# Patient Record
Sex: FEMALE | Race: BLACK OR AFRICAN AMERICAN | NOT HISPANIC OR LATINO | Employment: PART TIME | URBAN - METROPOLITAN AREA
[De-identification: names, ages, dates, MRNs, and addresses within clinical notes are randomized per-mention and may not be internally consistent; named-entity substitution may affect disease eponyms.]

---

## 2020-02-20 ENCOUNTER — HOSPITAL ENCOUNTER (EMERGENCY)
Facility: HOSPITAL | Age: 25
Discharge: HOME | End: 2020-02-20
Attending: EMERGENCY MEDICINE
Payer: MEDICAID

## 2020-02-20 VITALS
DIASTOLIC BLOOD PRESSURE: 61 MMHG | BODY MASS INDEX: 30.53 KG/M2 | HEIGHT: 66 IN | TEMPERATURE: 98 F | RESPIRATION RATE: 18 BRPM | SYSTOLIC BLOOD PRESSURE: 112 MMHG | WEIGHT: 190 LBS | OXYGEN SATURATION: 100 % | HEART RATE: 63 BPM

## 2020-02-20 DIAGNOSIS — F41.9 ANXIETY: Primary | ICD-10-CM

## 2020-02-20 DIAGNOSIS — R07.9 CHEST PAIN: ICD-10-CM

## 2020-02-20 LAB
B-HCG UR QL: NEGATIVE
CTP QC/QA: YES

## 2020-02-20 PROCEDURE — 63600175 PHARM REV CODE 636 W HCPCS: Performed by: EMERGENCY MEDICINE

## 2020-02-20 PROCEDURE — 93005 ELECTROCARDIOGRAM TRACING: CPT

## 2020-02-20 PROCEDURE — 81025 URINE PREGNANCY TEST: CPT | Performed by: EMERGENCY MEDICINE

## 2020-02-20 PROCEDURE — 93010 EKG 12-LEAD: ICD-10-PCS | Mod: ,,, | Performed by: INTERNAL MEDICINE

## 2020-02-20 PROCEDURE — 96374 THER/PROPH/DIAG INJ IV PUSH: CPT

## 2020-02-20 PROCEDURE — 99285 EMERGENCY DEPT VISIT HI MDM: CPT | Mod: 25

## 2020-02-20 PROCEDURE — 93010 ELECTROCARDIOGRAM REPORT: CPT | Mod: ,,, | Performed by: INTERNAL MEDICINE

## 2020-02-20 RX ORDER — LORAZEPAM 2 MG/ML
1 INJECTION INTRAMUSCULAR
Status: COMPLETED | OUTPATIENT
Start: 2020-02-20 | End: 2020-02-20

## 2020-02-20 RX ADMIN — LORAZEPAM 1 MG: 2 INJECTION INTRAMUSCULAR; INTRAVENOUS at 11:02

## 2020-02-20 NOTE — ED TRIAGE NOTES
Pt in room complaining  Of pain left side and middle of chest pt states she has history anxiety. Pt said she was at work at [employer] when she started feeling this way. Pt is Aox3. RR noted even and unlabored . VSS

## 2020-02-20 NOTE — ED PROVIDER NOTES
"Encounter Date: 2/20/2020    SCRIBE #1 NOTE: I, Shahriar Francois, am scribing for, and in the presence of,  Devaughn Stone MD. I have scribed the following portions of the note - the EKG reading. Other sections scribed: HPI, ROS, PE, MDM.       History     Chief Complaint   Patient presents with    Chest Pain     sudden onset of CP while at work this morning. pain localized to generalized anterior chest wall. denies radiation. Pt tearful in triage. hx of panic attacks.      This 24 y.o F with no pertinent PMHx presents to the ED c/p acute onset of constant and severe (10/10) chest pain described as "sharp" and "heaviness" which began x1 hour ago. 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 The pt states she arrived to work at 0500h this AM. She does report previous episodes of panic attacks, but states her current symptoms are previous episodes. She denies a hx of asthma. She denies appetite change, dysuria, difficulty urinating, constipation, diarrhea, rash and any other associated symptoms. She does smoke cigarettes. She does not consume EtOH or use illicit drugs.           The history is provided by the patient.     Review of patient's allergies indicates:   Allergen Reactions    Asa [aspirin] Anaphylaxis    Tylenol [acetaminophen] Anaphylaxis     History reviewed. No pertinent past medical history.  No past surgical history on file.  History reviewed. No pertinent family history.  Social History     Tobacco Use    Smoking status: Not on file   Substance Use Topics    Alcohol use: Not on file    Drug use: Not on file     Review of Systems   Constitutional: Negative for appetite change.   Cardiovascular: Positive for chest pain.   Gastrointestinal: Negative for constipation and diarrhea.   Genitourinary: Negative for difficulty urinating and dysuria.   Skin: Negative for rash.   All other systems reviewed and are negative.      Physical Exam     Initial Vitals [02/20/20 1019] "   BP Pulse Resp Temp SpO2   116/63 88 17 98.5 °F (36.9 °C) 99 %      MAP       --         Physical Exam    Nursing note and vitals reviewed.  Constitutional: She appears well-developed and well-nourished.  Non-toxic appearance. No distress.   Lying comfortably   HENT:   Head: Normocephalic and atraumatic.   Mouth/Throat: Oropharynx is clear and moist and mucous membranes are normal.   Eyes: Conjunctivae and EOM are normal. Pupils are equal, round, and reactive to light. Right conjunctiva is not injected. Left conjunctiva is not injected. No scleral icterus.   Neck: Normal range of motion and full passive range of motion without pain. Neck supple.   Cardiovascular: Normal rate, regular rhythm, S1 normal, S2 normal, normal heart sounds and normal pulses. Exam reveals no gallop and no friction rub.    No murmur heard.  Pulses:       Radial pulses are 2+ on the right side, and 2+ on the left side.   Pulmonary/Chest: Effort normal and breath sounds normal. No respiratory distress. She exhibits tenderness.   Abdominal: Soft. She exhibits no distension. There is no tenderness.   Musculoskeletal: Normal range of motion. She exhibits no edema.   Good active ROM of all extremities. No lower extremity edema or cyanosis.    Neurological: No cranial nerve deficit. Gait normal.   A&Ox4, normal speech.   Skin: Skin is warm. No ecchymosis and no rash noted.   Psychiatric: Thought content normal.   Seems anxious and nervous.         ED Course   Procedures  Labs Reviewed   POCT URINE PREGNANCY     EKG Readings: (Independently Interpreted)   EKG done 10:17 a.m. showing normal sinus rhythm rate 80.  Wavy baseline.  No ST elevation.  Normal axis QRS.  Nonspecific EKG.       Imaging Results          X-Ray Chest PA And Lateral (Final result)  Result time 02/20/20 11:20:44    Final result by Marquez Franco Jr., MD (02/20/20 11:20:44)                 Impression:      No significant cardiopulmonary abnormality  identified.      Electronically signed by: Marquez Franco MD  Date:    02/20/2020  Time:    11:20             Narrative:    EXAMINATION:  XR CHEST PA AND LATERAL    CLINICAL HISTORY:  Chest pain, unspecified    TECHNIQUE:  PA and lateral views of the chest were performed.    COMPARISON:  None    FINDINGS:  Monitoring leads noted.  Heart size pulmonary vessels are normal.  The lungs are well aerated and clear.  No pneumothorax.  No pleural fluid.  Bones are intact.                                 Medical Decision Making:   Initial Assessment:   24 year old patient with hx of panic attack presenting secondary to chest pain and looks anxious. Patient is at lower risk of ACS due to risk factors and HPI with a heart score of 0 without a troponin.  Patient allergic to aspirin. EKG was reassuring and chest xray showed nothing acute. Most likely musculoskeletal cause of patient with anxiety component    [email or web address]    Also considered but less likely:     PE: normal rate, no sob/recent immobilization/surgery/travel/family history. PERC 0  Pneumonia: chest xray negative. No fever. No cough and lungs non consistent with pna  Tamponade: unlikely due to chest xray and ekg  STEMI: No STEMI on ekg  Dissection: equal pulses bilaterally and no ripping chest pain to the back  Esophageal rupture: no dysphagia or vomiting and chest xray negative for mediastinal air  Arrhythmia: no arrhythmia on ekg  CHF: no fluid overload on Cxr and physical exam  Pneumothorax: bilateral breath sounds and no signs of pneumothorax on chest xray     Urine pregnancy is negative.  Patient felt better after IV Ativan.    Patient felt improved with interventions and has a lower risk of impending cardiac failure.. Patient was discharged with follow up with primary care given to patient. Return precautions given, patient understands and agrees with plan. All questions answered.  Instructed to follow up with PCP. I  discussed with the patient the diagnosis, treatment plan, indications for return to the emergency department, and for expected follow-up. The patient verbalized an understanding. The patient is asked if there are any questions or concerns. We discuss the case, until all issues are addressed to the patients satisfaction. Patient understands and is agreeable to the plan.  Outpatient resources for therapy for anxiety given to patient.  Devaughn Stone      Independently Interpreted Test(s):   I have ordered and independently interpreted EKG Reading(s) - see prior notes  Clinical Tests:   Radiological Study: Ordered and Reviewed  Medical Tests: Ordered and Reviewed            Scribe Attestation:   Scribe #1: I performed the above scribed service and the documentation accurately describes the services I performed. I attest to the accuracy of the note.                          Clinical Impression:       ICD-10-CM ICD-9-CM   1. Anxiety F41.9 300.00   2. Chest pain R07.9 786.50            I, Devaughn Stone, personally performed the services described in this documentation. All medical record entries made by the scribe were at my direction and in my presence. I have reviewed the chart and agree that the record reflects my personal performance and is accurate and complete.                   Devaughn Stone MD  02/20/20 2954       Devaughn Stone MD  02/20/20 1327